# Patient Record
Sex: MALE | Race: BLACK OR AFRICAN AMERICAN | NOT HISPANIC OR LATINO | URBAN - METROPOLITAN AREA
[De-identification: names, ages, dates, MRNs, and addresses within clinical notes are randomized per-mention and may not be internally consistent; named-entity substitution may affect disease eponyms.]

---

## 2019-01-18 ENCOUNTER — EMERGENCY (EMERGENCY)
Facility: HOSPITAL | Age: 24
LOS: 1 days | Discharge: HOME | End: 2019-01-18
Attending: EMERGENCY MEDICINE | Admitting: EMERGENCY MEDICINE
Payer: SELF-PAY

## 2019-01-18 VITALS
OXYGEN SATURATION: 99 % | RESPIRATION RATE: 16 BRPM | DIASTOLIC BLOOD PRESSURE: 91 MMHG | WEIGHT: 212.97 LBS | TEMPERATURE: 98 F | HEART RATE: 74 BPM | SYSTOLIC BLOOD PRESSURE: 152 MMHG

## 2019-01-18 DIAGNOSIS — R53.1 WEAKNESS: ICD-10-CM

## 2019-01-18 DIAGNOSIS — R11.0 NAUSEA: ICD-10-CM

## 2019-01-18 PROCEDURE — 93010 ELECTROCARDIOGRAM REPORT: CPT

## 2019-01-18 PROCEDURE — 99284 EMERGENCY DEPT VISIT MOD MDM: CPT | Mod: 25

## 2019-01-18 RX ORDER — ONDANSETRON 8 MG/1
4 TABLET, FILM COATED ORAL ONCE
Qty: 0 | Refills: 0 | Status: COMPLETED | OUTPATIENT
Start: 2019-01-18 | End: 2019-01-18

## 2019-01-18 RX ADMIN — ONDANSETRON 4 MILLIGRAM(S): 8 TABLET, FILM COATED ORAL at 21:56

## 2019-01-18 NOTE — ED PROVIDER NOTE - OBJECTIVE STATEMENT
Patient with unknown pmhx, presents with generalized weakness. patient notes he came to [facility] to visit a friend who never showed up. notes he has no place to go tonight, requesting food. notes while walking, felt like he was going to fall out, complained of generalized weakness, and nausea. denies cp, sob, palpitations or syncope. denies etoh or drug use. notes no hallucinations. denies prior similar symptoms or past medical history. notes feeling better, since he arrived.

## 2019-01-18 NOTE — ED ADULT TRIAGE NOTE - CHIEF COMPLAINT QUOTE
Pt with complaint of weakness and nausea that started while walking about 1 hour ago. Denies any vomiting.

## 2019-01-22 ENCOUNTER — EMERGENCY (EMERGENCY)
Facility: HOSPITAL | Age: 24
LOS: 1 days | Discharge: HOME | End: 2019-01-22
Attending: EMERGENCY MEDICINE | Admitting: EMERGENCY MEDICINE
Payer: SELF-PAY

## 2019-01-22 VITALS
DIASTOLIC BLOOD PRESSURE: 103 MMHG | HEART RATE: 59 BPM | RESPIRATION RATE: 18 BRPM | TEMPERATURE: 98 F | SYSTOLIC BLOOD PRESSURE: 166 MMHG

## 2019-01-22 DIAGNOSIS — L29.9 PRURITUS, UNSPECIFIED: ICD-10-CM

## 2019-01-22 DIAGNOSIS — R44.0 AUDITORY HALLUCINATIONS: ICD-10-CM

## 2019-01-22 DIAGNOSIS — Z76.5 MALINGERER [CONSCIOUS SIMULATION]: ICD-10-CM

## 2019-01-22 DIAGNOSIS — R23.8 OTHER SKIN CHANGES: ICD-10-CM

## 2019-01-22 PROCEDURE — 99053 MED SERV 10PM-8AM 24 HR FAC: CPT

## 2019-01-22 PROCEDURE — 99284 EMERGENCY DEPT VISIT MOD MDM: CPT | Mod: 25

## 2019-01-22 NOTE — ED ADULT TRIAGE NOTE - CHIEF COMPLAINT QUOTE
Pt complaining of hearing voices, racing thoughts and headache. Pt denies SI and HI. Pt denies psychiatric history.

## 2019-01-23 VITALS
SYSTOLIC BLOOD PRESSURE: 138 MMHG | OXYGEN SATURATION: 100 % | RESPIRATION RATE: 18 BRPM | DIASTOLIC BLOOD PRESSURE: 96 MMHG | TEMPERATURE: 98 F | HEART RATE: 57 BPM

## 2019-01-23 DIAGNOSIS — Z76.5 MALINGERER [CONSCIOUS SIMULATION]: ICD-10-CM

## 2019-01-23 DIAGNOSIS — R69 ILLNESS, UNSPECIFIED: ICD-10-CM

## 2019-01-23 LAB
ALBUMIN SERPL ELPH-MCNC: 4.3 G/DL — SIGNIFICANT CHANGE UP (ref 3.4–5)
ALP SERPL-CCNC: 91 U/L — SIGNIFICANT CHANGE UP (ref 40–120)
ALT FLD-CCNC: 81 U/L — HIGH (ref 12–42)
ANION GAP SERPL CALC-SCNC: 8 MMOL/L — LOW (ref 9–16)
APPEARANCE UR: CLEAR — SIGNIFICANT CHANGE UP
AST SERPL-CCNC: 65 U/L — HIGH (ref 15–37)
BASOPHILS NFR BLD AUTO: 0.5 % — SIGNIFICANT CHANGE UP (ref 0–2)
BILIRUB SERPL-MCNC: 0.2 MG/DL — SIGNIFICANT CHANGE UP (ref 0.2–1.2)
BILIRUB UR-MCNC: NEGATIVE — SIGNIFICANT CHANGE UP
BUN SERPL-MCNC: 17 MG/DL — SIGNIFICANT CHANGE UP (ref 7–23)
CALCIUM SERPL-MCNC: 9.5 MG/DL — SIGNIFICANT CHANGE UP (ref 8.5–10.5)
CHLORIDE SERPL-SCNC: 104 MMOL/L — SIGNIFICANT CHANGE UP (ref 96–108)
CO2 SERPL-SCNC: 30 MMOL/L — SIGNIFICANT CHANGE UP (ref 22–31)
COLOR SPEC: YELLOW — SIGNIFICANT CHANGE UP
CREAT SERPL-MCNC: 0.99 MG/DL — SIGNIFICANT CHANGE UP (ref 0.5–1.3)
DIFF PNL FLD: ABNORMAL
EOSINOPHIL NFR BLD AUTO: 2 % — SIGNIFICANT CHANGE UP (ref 0–6)
ETHANOL SERPL-MCNC: <3 MG/DL — SIGNIFICANT CHANGE UP
GLUCOSE SERPL-MCNC: 94 MG/DL — SIGNIFICANT CHANGE UP (ref 70–99)
GLUCOSE UR QL: NEGATIVE — SIGNIFICANT CHANGE UP
HCT VFR BLD CALC: 47 % — SIGNIFICANT CHANGE UP (ref 39–50)
HGB BLD-MCNC: 15.5 G/DL — SIGNIFICANT CHANGE UP (ref 13–17)
HIV 1 & 2 AB SERPL IA.RAPID: SIGNIFICANT CHANGE UP
IMM GRANULOCYTES NFR BLD AUTO: 0.5 % — SIGNIFICANT CHANGE UP (ref 0–1.5)
KETONES UR-MCNC: NEGATIVE — SIGNIFICANT CHANGE UP
LEUKOCYTE ESTERASE UR-ACNC: NEGATIVE — SIGNIFICANT CHANGE UP
LYMPHOCYTES # BLD AUTO: 43.9 % — SIGNIFICANT CHANGE UP (ref 13–44)
MAGNESIUM SERPL-MCNC: 1.9 MG/DL — SIGNIFICANT CHANGE UP (ref 1.6–2.6)
MCHC RBC-ENTMCNC: 28.1 PG — SIGNIFICANT CHANGE UP (ref 27–34)
MCHC RBC-ENTMCNC: 33 G/DL — SIGNIFICANT CHANGE UP (ref 32–36)
MCV RBC AUTO: 85.3 FL — SIGNIFICANT CHANGE UP (ref 80–100)
MONOCYTES NFR BLD AUTO: 8.8 % — SIGNIFICANT CHANGE UP (ref 2–14)
NEUTROPHILS NFR BLD AUTO: 44.3 % — SIGNIFICANT CHANGE UP (ref 43–77)
NITRITE UR-MCNC: NEGATIVE — SIGNIFICANT CHANGE UP
PCP SPEC-MCNC: SIGNIFICANT CHANGE UP
PH UR: 6 — SIGNIFICANT CHANGE UP (ref 5–8)
PLATELET # BLD AUTO: 189 K/UL — SIGNIFICANT CHANGE UP (ref 150–400)
POTASSIUM SERPL-MCNC: 3.8 MMOL/L — SIGNIFICANT CHANGE UP (ref 3.5–5.3)
POTASSIUM SERPL-SCNC: 3.8 MMOL/L — SIGNIFICANT CHANGE UP (ref 3.5–5.3)
PROT SERPL-MCNC: 7.7 G/DL — SIGNIFICANT CHANGE UP (ref 6.4–8.2)
PROT UR-MCNC: NEGATIVE MG/DL — SIGNIFICANT CHANGE UP
RBC # BLD: 5.51 M/UL — SIGNIFICANT CHANGE UP (ref 4.2–5.8)
RBC # FLD: 13.5 % — SIGNIFICANT CHANGE UP (ref 10.3–14.5)
SODIUM SERPL-SCNC: 142 MMOL/L — SIGNIFICANT CHANGE UP (ref 132–145)
SP GR SPEC: 1.02 — SIGNIFICANT CHANGE UP (ref 1–1.03)
UROBILINOGEN FLD QL: 0.2 E.U./DL — SIGNIFICANT CHANGE UP
WBC # BLD: 6.4 K/UL — SIGNIFICANT CHANGE UP (ref 3.8–10.5)
WBC # FLD AUTO: 6.4 K/UL — SIGNIFICANT CHANGE UP (ref 3.8–10.5)

## 2019-01-23 PROCEDURE — 90792 PSYCH DIAG EVAL W/MED SRVCS: CPT | Mod: GT

## 2019-01-23 PROCEDURE — 70450 CT HEAD/BRAIN W/O DYE: CPT | Mod: 26

## 2019-01-23 NOTE — ED PROVIDER NOTE - OBJECTIVE STATEMENT
23 y.o M with no known PMhx presents to the ED with c/o auditory hallucinations. Pt describes hearing multiple voices at once that "tell him what to do." Denies hearing negative thoughts of self harm or harming others. Pt states voices are intermittent with most recent episode occurring yesterday. Prior to last episode, last episode occurred 1 year ago. He currently lives in an apartment with friends and family who noted he "appears off." Denies seeing a psychiatrist for this complaint. He is currently employed working in music management.   Pt also reports right foot burning and itchiness x1 day.   Denies fever, chills, N/V, SI, HI.

## 2019-01-23 NOTE — ED BEHAVIORAL HEALTH ASSESSMENT NOTE - DESCRIPTION
unremarkable none pt. graduated HS and now lives with friends.  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

## 2019-01-23 NOTE — ED BEHAVIORAL HEALTH ASSESSMENT NOTE - SUMMARY
Patient is a 24 y/o AA male, employed, domiciled with no past psych hx, bib self for hearing voices.  Patient denies any past psych admissions, never seen by psych, denies past suicide attempts, denies self injury, denies arrests or legal hx.      Patient was pleasant and smiling throughout interview.  He said at first he heard voices but not bad voices.  But as I inquired about these voices I was told "well maybe they are my thoughts.  For instance" he says - "all of a sudden Lasagna just popped into my head."  He denies any command hallucinations.  He does not appear to be responding to internal stimuli.  He denies any depressive symptoms or suicidal thoughts.  There may be a secondary gain in coming to the hospital - food and a warm bed??  He agreed he did not need to stay but did not want to leave in the middle of the night - agreed to leave in the AM.

## 2019-01-23 NOTE — ED PROVIDER NOTE - MEDICAL DECISION MAKING DETAILS
Pt presenting with auditory hallucinations with first medical evaluation for this. Will get CT head and obtain psych consult for possible new onset of mental illness.

## 2019-01-23 NOTE — ED PROVIDER NOTE - NSFOLLOWUPINSTRUCTIONS_ED_ALL_ED_FT
Return to the ER for Emergencies.   [facility] is a good referral line for crisis and substance abuse help.  [facility] has a walk in mental health Clinic if you are worried about voices or any other mental health condition.

## 2019-01-23 NOTE — ED PROVIDER NOTE - SKIN, MLM
Skin normal color for race, warm, dry and intact. tinea pedis between 1st and 2nd toe of right foot.

## 2019-01-23 NOTE — ED PROVIDER NOTE - PROGRESS NOTE DETAILS
Cleared by psych- he said that for exa pel he was having things like the word emilie popping in to his head. Possible malingering for a place to stay. Asked to be d/c'd in am. Will give psych resources.

## 2019-01-23 NOTE — ED ADULT NURSE NOTE - HPI (INCLUDE ILLNESS QUALITY, SEVERITY, DURATION, TIMING, CONTEXT, MODIFYING FACTORS, ASSOCIATED SIGNS AND SYMPTOMS)
no PMH; states he has had voices talking to him before but they never have been this strong or lasted as long as now; hearing voices since last night

## 2019-01-23 NOTE — ED BEHAVIORAL HEALTH ASSESSMENT NOTE - HPI (INCLUDE ILLNESS QUALITY, SEVERITY, DURATION, TIMING, CONTEXT, MODIFYING FACTORS, ASSOCIATED SIGNS AND SYMPTOMS)
Patient is a 22 y/o AA male, employed, domiciled with no past psych hx, bib self for hearing voices.  Patient denies any past psych admissions, never seen by psych, denies past suicide attempts, denies self injury, denies arrests or legal hx.      Patient was pleasant and smiling throughout interview.  He said at first he heard voices but not bad voices.  But as I inquired about these voices I was told "well maybe they are my thoughts.  For instance" he says - "all of a sudden Lasagna just popped into my head."  He denies any command hallucinations.  He does not appear to be responding to internal stimuli.  He denies any depressive symptoms or suicidal thoughts.  He claims he has a job - but malodorous.  He claims he lives with friends and a relative but on last visit he said he was homeless.  He claims he has a job.  I have no collateral or any phone numbers to get collateral.  Patient seems limited but says he graduated from  - but cannot tell me the city where he was raised.  Patient. denies any acute stressors and endorses his sleep and appetite are ok.

## 2019-01-23 NOTE — ED ADULT NURSE NOTE - OBJECTIVE STATEMENT
patient here stating he is hearing voices that are "telling him to do things"; denies SI/HI; vocal hallucinations in past but never sought help; calm and cooperative in triage; wants to talk to psych

## 2019-01-23 NOTE — ED PROVIDER NOTE - CONSTITUTIONAL, MLM
normal... Appears well groomed but slightly foul smelling, well nourished, awake, alert, oriented to person, place, time/situation and in no apparent distress.

## 2019-01-23 NOTE — ED ADULT NURSE REASSESSMENT NOTE - NS ED NURSE REASSESS COMMENT FT1
patient spoke with [facility] and to be d/c'ed in AM; given information faxed by psych for outpatient mental health services

## 2023-01-25 NOTE — ED ADULT NURSE NOTE - HIV INFORMATION PROVIDED
Follow up with Dr. Ailin Marc regarding eye issues     Please bring ALL medications bottles including over-the-counter medications to your next appointment !!!     
Yes

## 2023-10-08 NOTE — ED BEHAVIORAL HEALTH ASSESSMENT NOTE - RISK ASSESSMENT
Patient Patient is not hearing voices, not responding to internal stimuli, no past suicide attempts, legal hx or psych hx.  Patient is at low  risk for harm and will be a T&R.  He will leave in AM.

## 2024-03-25 NOTE — ED PROVIDER NOTE - MUSCULOSKELETAL NEGATIVE STATEMENT, MLM
Detail Level: Detailed Quality 431: Preventive Care And Screening: Unhealthy Alcohol Use - Screening: Patient not identified as an unhealthy alcohol user when screened for unhealthy alcohol use using a systematic screening method Quality 226: Preventive Care And Screening: Tobacco Use: Screening And Cessation Intervention: Tobacco Screening not Performed Quality 130: Documentation Of Current Medications In The Medical Record: Current Medications Documented no back pain, no gout, no musculoskeletal pain, no neck pain, and no weakness.